# Patient Record
Sex: FEMALE | Race: WHITE | NOT HISPANIC OR LATINO | Employment: FULL TIME | URBAN - METROPOLITAN AREA
[De-identification: names, ages, dates, MRNs, and addresses within clinical notes are randomized per-mention and may not be internally consistent; named-entity substitution may affect disease eponyms.]

---

## 2025-04-25 DIAGNOSIS — M79.671 PAIN IN BOTH FEET: Primary | ICD-10-CM

## 2025-04-25 DIAGNOSIS — M79.672 PAIN IN BOTH FEET: Primary | ICD-10-CM

## 2025-04-29 ENCOUNTER — OFFICE VISIT (OUTPATIENT)
Dept: PODIATRY | Facility: CLINIC | Age: 35
End: 2025-04-29
Payer: COMMERCIAL

## 2025-04-29 ENCOUNTER — HOSPITAL ENCOUNTER (OUTPATIENT)
Dept: RADIOLOGY | Facility: HOSPITAL | Age: 35
Discharge: HOME | End: 2025-04-29
Payer: COMMERCIAL

## 2025-04-29 VITALS
BODY MASS INDEX: 41.52 KG/M2 | DIASTOLIC BLOOD PRESSURE: 81 MMHG | HEIGHT: 70 IN | RESPIRATION RATE: 16 BRPM | HEART RATE: 56 BPM | OXYGEN SATURATION: 98 % | SYSTOLIC BLOOD PRESSURE: 138 MMHG | WEIGHT: 290 LBS | TEMPERATURE: 97 F

## 2025-04-29 DIAGNOSIS — M79.672 PAIN IN BOTH FEET: ICD-10-CM

## 2025-04-29 DIAGNOSIS — M79.675 PAIN IN TOE OF LEFT FOOT: ICD-10-CM

## 2025-04-29 DIAGNOSIS — L60.3 NAIL DYSTROPHY: ICD-10-CM

## 2025-04-29 DIAGNOSIS — M20.41 HAMMER TOES OF BOTH FEET: Primary | ICD-10-CM

## 2025-04-29 DIAGNOSIS — M79.671 PAIN IN BOTH FEET: ICD-10-CM

## 2025-04-29 DIAGNOSIS — R60.0 LOCALIZED EDEMA: ICD-10-CM

## 2025-04-29 DIAGNOSIS — M20.42 HAMMER TOES OF BOTH FEET: Primary | ICD-10-CM

## 2025-04-29 PROCEDURE — 73630 X-RAY EXAM OF FOOT: CPT | Mod: BILATERAL PROCEDURE | Performed by: RADIOLOGY

## 2025-04-29 PROCEDURE — 99214 OFFICE O/P EST MOD 30 MIN: CPT | Performed by: PODIATRIST

## 2025-04-29 PROCEDURE — 73630 X-RAY EXAM OF FOOT: CPT | Mod: 50

## 2025-04-29 RX ORDER — ESCITALOPRAM OXALATE 10 MG/1
1 TABLET ORAL DAILY
COMMUNITY
Start: 2018-08-01

## 2025-04-29 NOTE — PROGRESS NOTES
Initial Podiatric Office Visit:    Chief Complaint: Left fifth digit pain          HPI:  This 34 y.o. female with PMH indicated below presents complaining of fifth digit pain.  Patient states that she has noticed that her bilateral fifth digit is turning and that she has a callus formation that is painful to the lateral left fifth digit worse than right.  Patient does have a history of ORIF of the left ankle several years ago which has healed properly.  Patient is also states that she does have swelling consistent swelling lymphedema versus lipedema to bilateral lower extremities left worse than right.  She rates her discomfort a 4 out of 10 on the pain scale.  She states that she is interested in potential surgery of the fifth toe to be rotated to prevent rubbing.  She denies any constitutional symptoms at this time.  No acute complaints.      PCP:  Richard Williamson MD:    [facility]  Medical History[1]:    MEDICATIONS  Current Medications[2]:  Allergies[3]:  Surgical History[4]  Family History[5]:  Social History[6]    REVIEW OF SYSTEMS    GENERAL: No weight loss, malaise or fevers.  HEENT: Negative for frequent or significant headaches,   RESPIRATORY: Negative for cough, wheezing or shortness of breath.  CARDIOVASCULAR: Negative for chest pain, leg swelling or palpitations.  GI: Negative for abdominal discomfort,  MUSCULOSKELETAL: No back pain      SKIN: Negative for lesions, rash, and itching.  NEURO: No numbess, tingling or burning in feet      Physical Exam:       Patient is alert and oriented x 3 in NAD    Vascular:   2/4 palpable Dorsalis Pedis and Posterior Tibial Pulses B/L  Capillary Fill time < 3 seconds to digits 1-5 B/L  Skin temperature warm to warm tibial tuberosity to the digits B/L  Noted bilateral lower extremity edema.  No varicosities    Neurological:   Intact light touch/epicritic sensation B/L.   Intact sharp/dull sensations  Intact protective sensation, no clonus b/l.      Dermatological:   Skin  appears well hydrated and supple. good color, texture, turgor. No open lesions present. Hyperkeratosis not noted however there is no thickening of the left fifth digit lateral nail border. Webspaces clean and dry 1-4 b/l. Nails 1-5 b/l appear normal.  Well-healed cicatrix to the left ORIF site.    Musculoskeletal/Orthopaedic:     +5/5 muscle strength Dorsiflexion, Plantarflexion, Inversion, Eversion B/L  ROM of the 1st MTPJ is normal without pain or crepitus b/l.  ROM of the MTJ/STJ is full without pain or crepitus b/l.  Ankle joint ROM is decreased  B/L.  Adductovarus deformity bilateral fifth digit    Radiographs: Reviewed    ASSESSMENT:   1. Hammer toes of both feet    2. Nail dystrophy    3. Pain in toe of left foot              Plan:    - Initial Office Visit   - Etiology and treatment options were discussed with the patient.  -Patient examined and evaluated  - Images reviewed.  - Discussed with patient that her pain to the left toe was due to the adductovarus deformity as well as pressure of the nail which is causing thickening and rubbing while ambulating.  - Patient has tried wider shoes and is still having issues.  - Discussed with patient fifth digit arthroplasty to correct the deformity.  -  Discussed with patient all potential risks and benefits of surgical procedure.  Discussed the exhaustion of conservative therapy versus the need for surgical intervention at this time.  All questions and concerns were answered the patient satisfaction.  No problems or guarantees were made.  Discussed with patient potential overcorrection or under correction of the deformity as well as potential risks with extended healing, possible infection, wound dehiscence, nerve irritability and/or damage, increase in swelling, and overall under favorable outcome.  Discussed with patient the goal of surgery is to and prove the quality of life and to correct the deformity with the best possible outcome.  Patient is aware and  understands that no promises or guarantees are going to be made.  Discussed postop protocol in length with patient and patient is in agreement with expected healing process and protocol.  At this time patient consents to procedure.     -Will send prior Auth and follow-up for surgical planning and date    Alberta Nye DPM    A total of  45 minutes was spent in formulation of this note, review of charts, labs,  imaging , surgical planning and discussion, with a minimum of 50% of the time spent in face to face with with the patient.  All questions and concerns were answered to the patients satisfaction.     Off note, use of vocal Dragon dictation system was used to dictate this document.  All proper spelling and grammatical errors may not have been corrected prior to final submission.           [1]   Past Medical History:  Diagnosis Date    Other specified health status     No pertinent past medical history   [2]   No current outpatient medications on file.     No current facility-administered medications for this visit.   [3] Not on File  [4] No past surgical history on file.  [5] No family history on file.  [6]   Social History  Socioeconomic History    Marital status: Single     Social Drivers of Health     Financial Resource Strain: Low Risk  (8/17/2023)    Received from [email or web address]    Overall Financial Resource Strain (CARDIA)     Difficulty of Paying Living Expenses: Not hard at all   Food Insecurity: No Food Insecurity (8/17/2023)    Received from [email or web address]    Hunger Vital Sign     Worried About Running Out of Food in the Last Year: Never true     Ran Out of Food in the Last Year: Never true   Transportation Needs: No Transportation Needs (8/17/2023)    Received from [email or web address]    PRAPARE - Transportation     Lack of Transportation (Medical): No     Lack of Transportation (Non-Medical): No   Physical Activity: Insufficiently Active (8/17/2023)    Received from [email or web address]    Exercise Vital Sign     Days  of Exercise per Week: 3 days     Minutes of Exercise per Session: 20 min   Stress: No Stress Concern Present (8/17/2023)    Received from [employer]    [employer] - Occupational Stress Questionnaire     Feeling of Stress : Not at all   Social Connections: Socially Isolated (8/17/2023)    Received from [employer]    Social Connection and Isolation Panel [NHANES]     Frequency of Communication with Friends and Family: Twice a week     Frequency of Social Gatherings with Friends and Family: Once a week     Attends Sabianism Services: Never     Active Member of Clubs or Organizations: No     Attends Club or Organization Meetings: Never     Marital Status: Never    Intimate Partner Violence: Not At Risk (8/17/2023)    Received from [employer]    Humiliation, Afraid, Rape, and Kick questionnaire     Fear of Current or Ex-Partner: No     Emotionally Abused: No     Physically Abused: No     Sexually Abused: No   Housing Stability: Low Risk  (8/17/2023)    Received from [employer]    Housing Stability Vital Sign     Unable to Pay for Housing in the Last Year: No     Number of Places Lived in the Last Year: 1     Unstable Housing in the Last Year: No